# Patient Record
Sex: MALE | Race: WHITE | NOT HISPANIC OR LATINO | Employment: FULL TIME | URBAN - METROPOLITAN AREA
[De-identification: names, ages, dates, MRNs, and addresses within clinical notes are randomized per-mention and may not be internally consistent; named-entity substitution may affect disease eponyms.]

---

## 2017-01-10 ENCOUNTER — GENERIC CONVERSION - ENCOUNTER (OUTPATIENT)
Dept: OTHER | Facility: OTHER | Age: 72
End: 2017-01-10

## 2017-01-13 ENCOUNTER — ALLSCRIPTS OFFICE VISIT (OUTPATIENT)
Dept: OTHER | Facility: OTHER | Age: 72
End: 2017-01-13

## 2017-01-13 DIAGNOSIS — G62.9 POLYNEUROPATHY: ICD-10-CM

## 2017-01-13 DIAGNOSIS — G89.4 CHRONIC PAIN SYNDROME: ICD-10-CM

## 2017-01-13 DIAGNOSIS — M79.10 MYALGIA: ICD-10-CM

## 2017-01-13 DIAGNOSIS — M47.816 SPONDYLOSIS OF LUMBAR REGION WITHOUT MYELOPATHY OR RADICULOPATHY: ICD-10-CM

## 2017-01-20 ENCOUNTER — GENERIC CONVERSION - ENCOUNTER (OUTPATIENT)
Dept: OTHER | Facility: OTHER | Age: 72
End: 2017-01-20

## 2017-03-01 ENCOUNTER — GENERIC CONVERSION - ENCOUNTER (OUTPATIENT)
Dept: OTHER | Facility: OTHER | Age: 72
End: 2017-03-01

## 2017-03-07 ENCOUNTER — ALLSCRIPTS OFFICE VISIT (OUTPATIENT)
Dept: OTHER | Facility: OTHER | Age: 72
End: 2017-03-07

## 2017-05-01 ENCOUNTER — ALLSCRIPTS OFFICE VISIT (OUTPATIENT)
Dept: OTHER | Facility: OTHER | Age: 72
End: 2017-05-01

## 2017-05-08 ENCOUNTER — GENERIC CONVERSION - ENCOUNTER (OUTPATIENT)
Dept: OTHER | Facility: OTHER | Age: 72
End: 2017-05-08

## 2017-07-10 ENCOUNTER — ALLSCRIPTS OFFICE VISIT (OUTPATIENT)
Dept: OTHER | Facility: OTHER | Age: 72
End: 2017-07-10

## 2017-09-07 ENCOUNTER — GENERIC CONVERSION - ENCOUNTER (OUTPATIENT)
Dept: OTHER | Facility: OTHER | Age: 72
End: 2017-09-07

## 2017-10-02 ENCOUNTER — ALLSCRIPTS OFFICE VISIT (OUTPATIENT)
Dept: OTHER | Facility: OTHER | Age: 72
End: 2017-10-02

## 2017-10-16 ENCOUNTER — GENERIC CONVERSION - ENCOUNTER (OUTPATIENT)
Dept: OTHER | Facility: OTHER | Age: 72
End: 2017-10-16

## 2018-01-10 NOTE — MISCELLANEOUS
Message  Return to work or school:   Garret Mead is under my professional care  He was seen in my office on 1/13/17   He is able to return to work on  if light duty available    He is not able to participate in sports or gym class  Weight Bearing Status: Full Weight-Bearing  No lifting, pulling, pushing, carrying over 10 #, no frequent bending or any climbing or balancing          Signatures   Electronically signed by : Mendel Rao, DO; Jan 13 2017  3:34PM EST                       (Author)

## 2018-01-11 NOTE — RESULT NOTES
Message   Recorded as Task   Date: 01/19/2017 10:21 AM, Created By: Darian Watts   Task Name: Follow Up   Assigned To: [facility] [place] [facility],Team   Regarding Patient: Kirsten Greenfield, Status: In Progress   Comment:    Sean Medrano - 19 Jan 2017 10:21 AM     TASK CREATED  Caller: demetrius briceño, Spouse; General Medical Question; [phone]  m 1/19/2017 @ [phone]  [facility] calling for   Getting low on duloxetine 30 mg  [facility] [place]   Zeynep Loza - 19 Jan 2017 12:03 PM     TASK EDITED  Script last filled on 12/8  No sovs scheduled  Last seen on 12/8  DG to advise  thanks   Sukhjinder Hill - 19 Jan 2017 12:12 PM     TASK REPLIED TO: Previously Assigned To Sukhjinder Hill  How is he doing on the 30 mg? Any relief? Any side effects? Does he want to try to titrate up to 40 mg for now? Sean Medrano - 19 Jan 2017 2:43 PM     TASK EDITED  Attempted to reach hope at the cb number provided per release of inf on file  "The person you are trying to reach is not accepting calls at this time  "    Left a detailed message on machine as per release of info on file  Advised pt to cb to discuss duloxedtine  provided cb number  Zeynep Loza - 20 Jan 2017 11:54 AM     TASK EDITED  S/w th ept  and he states he doesn't feel like it is really working at all  His pain is still the same  He just notices that he has a little hot flashes in the am and then they go away  DG to advise  Sukhjinder Griffin - 20 Jan 2017 12:35 PM     TASK REPLIED TO: Previously Assigned To Sukhjnider Hill  Please advise the patient that he is on a low and sub therapeutic dose  Since he is not noting any sig side effects I am going to increase it to 40 mg at bedtime, but it doesn't come in 40 mg, but I did send a script for 20 mg 2 PO HS and he should take that until his next OV  He should call if he experiences any side effects or issues with increase  Thank you  Zeynep Loza - 20 Jan 2017 1:11 PM     TASK EDITED  S/w th ept  and he is aware  Signatures   Electronically signed by : La Ibarra, ; Jan 20 2017  1:11PM EST                       (Author)

## 2018-01-11 NOTE — CONSULTS
Assessment   1  Peripheral neuropathy (356 9) (G62 9)  2  Chronic myofascial pain (729 1,338 29) (M79 1,G89 29)    Plan  Chronic myofascial pain    · Follow-up Visit in 4 Weeks Evaluation and Treatment  Follow-up  Status: Hold For -  Scheduling  Requested for: 26JND5096   · (1) ALDOLASE; Status:Active; Requested UQF:[id number]; 1   Chronic myofascial pain, Lumbar spondylosis, Pain syndrome, chronic, Peripheral  neuropathy    · (1) CK (CPK); Status:Active; Requested VFY:[id number]; 1    · (1) SED RATE; Status:Active; Requested QQR:[id number]; 1   Lumbar spondylosis, Pain syndrome, chronic, Peripheral neuropathy    · (1) C-REACTIVE PROTEIN; Status:Active; Requested PTH:[id number]; 1      1 Amended By: Jesusita Monterroso; Jan 13 2017 3:35 PM EST    Discussion/Summary    Continue your care with Pain Medicine and Dr Diogo Peterson office  Salvador Roberson Botox injections may be beneficial    Any disability forms can be sent to this office  Chief Complaint  Return to work issues  History of Present Illness  74 yo male referred by Pain Medicine for disability issues  Last worked January 25th 2016    / heavy duty  lifting 30 to 50 # 25 times an hour  Currently under care of Pain Medicine, current pregabalin trial  followed by Dr Ledy Castillo of Orthopedics, [place] [facility]ESIs and SNRBs, had facet joint injection w/o benefit  Has been referred to  [facility] for Tx of myofascial yocasta   consult pending  Pattern is diffuse lumbodorsal region   most recent onset with arising from couch  Reports onset of LE Sx of numbness and tingling about October shortly noticed after LESI  Had an episode of shocklike sensations from chest distally last year that eventually resolved  No BBI  Had EDX study c/w chronic Left L-5 radiculopathy 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  Salvador Roberson Neurology evaluation for this is pending   told maybe "borderline DM  Active Problems   1  Arthritis (716 90) (M19 90)  2   Chronic low back pain (724 2,338 29) (M54 5,G89 29)  3  Chronic myofascial pain (729 1,338 29) (M79 1,G89 29)  4  Disability examination (V68 01) (Z02 71)  5  GERD (gastroesophageal reflux disease) (530 81) (K21 9)  6  Lumbar spondylosis (721 3) (M47 816)  7  Pain syndrome, chronic (338 4) (G89 4)  8  Peripheral neuropathy (356 9) (G62 9)  9  Work related injury (959 9) (Y99 0)    Surgical History   1  History of Appendectomy    Family History  Father   1  Family history of cardiac disorder (V17 49) (Z82 49)    Social History    · Denied: History of Drug use   · Never a smoker   · Social alcohol use (Z78 9)    Current Meds  1  CeleBREX 200 MG Oral Capsule (Celecoxib); TAKE 1 CAPSULE DAILY WITH A MEAL; Therapy: (Recorded:22Nov2016) to Recorded  2  DULoxetine HCl - 30 MG Oral Capsule Delayed Release Particles; Once off of   Gabapentin  Take 1 pill every other night x 10 days, then 1 pill every night; Therapy: 15IQB1607 to (Evaluate:07Jan2017)  Requested for: 12Jan2017; Last   Rx:86Ylf0906; Status: ACTIVE - Renewal Denied Ordered  3  Gabapentin 300 MG Oral Capsule; Take 1 pill 3 x daily x 3 days, then 1 pill 2 x radha   x 3 days, then 1 pill daily x 4 days, then STOP  Then start Duloxetine; Therapy: 27UDM7034 to (Evaluate:80Vjd0132); Last Rx:20Mmi3345 Ordered  4  Omega-3 Fish Oil CAPS; take 1 capsule daily; Therapy: (Recorded:22Nov2016) to Recorded  5  Osteo-Bi-Flex TABS; Take 1 tablet twice daily; Therapy: (Recorded:22Nov2016) to Recorded  6  Tylenol Extra Strength 500 MG TABS; TAKE 1 TABLET EVERY 4 TO 6 HOURS AS   NEEDED; Therapy: (Recorded:22Nov2016) to Recorded  7  Vitamin D 2000 UNIT Oral Capsule; take 1 capsule daily; Therapy: (Recorded:22Nov2016) to Recorded    Allergies   1  Morphine Derivatives  2  OxyCODONE HCl CAPS  3  Codeine  4   Gabapentin CAPS    Vitals  Signs   Recorded: 12RSR0861 02:46PM   Heart Rate: 073  Systolic: 126  Diastolic: 80  Height: 6 ft 2 in  Weight: 172 lb 8 0 oz  BMI Calculated: 22 15  BSA Calculated: 2 04    Physical Exam  Mild fine tremor in RUE  Lumbosacral Spine: Special Tests: negative Straight Leg Raise  Constitutional - General appearance: Normal    Musculoskeletal - Gait and station: Normal  Digits and nails: Normal  Muscle strength/tone: Normal    Neurologic - Cranial nerves: Normal  Reflexes: Abnormal  Deep tendon reflexes: 1+ right patella, 1+ left patella, 1+ right ankle jerk, 1+ left ankle jerk and Sharif's negative,2+ right biceps, 2+ left biceps, 2+ right triceps, 2+ left triceps, 2+ right brachioradialis, 2+ left brachioradialis, no ankle clonus on the right and no ankle clonus on the left  Upper extremity peripheral neuro exam: Normal  Distal pulse examination findings: MMT grades all muscles 5/5  Lower extremity peripheral neuro exam: Normal  Distal pulse examination findings: able to walk on toes and heels and squat  Results/Data  I personally reviewed the films/images/results in the office today  My interpretation follows  MRI Review ? 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

## 2018-01-11 NOTE — PROGRESS NOTES
Assessment    1  Chronic low back pain (724 2,338 29) (M54 5,G89 29)   2  Chronic myofascial pain (729 1,338 29) (M79 1,G89 29)   3  Lumbar spondylosis (721 3) (M47 816)   4  Peripheral neuropathy (356 9) (G62 9)   5  Pain syndrome, chronic (338 4) (G89 4)    Plan   Chronic low back pain, Chronic myofascial pain, Lumbar spondylosis, Pain syndrome,  chronic, Peripheral neuropathy    · Nortriptyline HCl - 10 MG Oral Capsule; Once off of Cymbalta  Take 1 pill every other  night x 10 days, then 1 PO HS x 2 weeks, then 2 PO HS   Rx By: Grupo Harrell; Dispense: 30 Days ; #:60 Capsule; Refill: 1; For: Chronic low back pain, Chronic myofascial pain, Lumbar spondylosis, Pain syndrome, chronic, Peripheral neuropathy; JANEL = N; Verified Transmission to [id number]; Last Updated By: System, [employer]; 3/7/2017 10:03:13 AM   · From  DULoxetine HCl - 20 MG Oral Capsule Delayed Release Particles Take  2 PO hs To DULoxetine HCl - 20 MG Oral Capsule Delayed Release Particles Take 1  pill at bed time x 7 days, then 1 pill every other night x 6 days, then  STOP   Rx By: Grupo Harrell; Dispense: 13 Days ; #:10 Capsule Delayed Release Particles; Refill: 0; For: Chronic low back pain, Chronic myofascial pain, Lumbar spondylosis, Pain syndrome, chronic, Peripheral neuropathy; JANEL = N; Record    Follow-up visit in 2 months Evaluation and Treatment  Follow-up  Status: Hold For - Scheduling  Requested for: 67DAH6683  Ordered; For: Pain syndrome, chronic;  Ordered By: Grupo Harrell  Performed:   Due: 08RZA8807     Discussion/Summary    While the patient and his wife are in the office today, I did have a thorough conversation with the patient regarding his medication regimen and treatment plan options  At this point I still feel there is a significant myofascial component and I would like him to follow-up with Dr Carlos Enrique Marinelli for the consultation as scheduled later on this month  The patient was agreeable and verbalized an understanding  With regards to his medication regimen as at this point that seems to be one of his biggest options, since he is on a subtherapeutic dose of Cymbalta, but is noting side effects, I feel would be in his best interest to titrate off of the Cymbalta as discussed and try different neuropathic medication such as nortriptyline  I advised the patient that once she is off the nortriptyline he is to take the nortriptyline one pill every other night for 10 days, then 1 pill every night for 2 weeks, then 2 pills at bedtime  I advised the patient that since she seems to be sensitive to medications, we are taking a very slow and steady titration approach with the nortriptyline as this is a subtherapeutic dose, but we are trying to see if he can tolerate the medication before we get more into the therapeutic dosing  I advised the patient that if they experience any side effects or issues with the changes in their medication regiment, they should give our office a call to discuss  I also advised the patient not to drive or operate machinery until they see how the changes in the medication regimen affects them  The patient was agreeable and verbalized an understanding  The patient is to schedule a follow-up office visit in 2 months at that point time we will regroup with regards to the medication regimen and treatment plan  The patient was agreeable and verbalized an understanding  The patient has the current Goals: To find a medication regimen and treatment plan the provided at least moderate in stable relief, without side effects  The patent has the current Barriers: None  Patient is able to Self-Care  Educational resources provided: While the patient was in the office today, I discussed with the patient that with medication management our overall goal is to reduce the pain symptoms by 50%, 50% of the time, on the least amount medications, with the least amount side effects  The patient was agreeable and verbalized an understanding  Possible side effects of new medications were reviewed with the patient/guardian today  The treatment plan was reviewed with the patient/guardian  The patient/guardian understands and agrees with the treatment plan   The patient and patient's family was counseled regarding instructions for management, prognosis, patient and family education, impressions, risks and benefits of treatment options and importance of compliance with treatment  total time of encounter was 25 minutes  Chief Complaint    1  Pain  Chronic low back and leg pain  History of Present Illness  The patient and his wife present today for a follow-up office visit  The patient is currently being treated for his chronic low back pain and lower extremity radicular symptoms with EMG evidence of a left L5 radiculopathy  Since his last office visit his pain symptoms have not improved or changed, despite the fact that we have titrate him off of the gabapentin and he is now on Cymbalta at 40 mg a day  Patient reports that he does not feel he is noting any relief from the Cymbalta and since he is on the Cymbalta he is having intermittent headaches and hot flashes  The patient reports that he did schedule a consultation with Dr Ayden Webber and is going to see him on March 20, 2017 for the evaluation of the chronic myofascial pain  The patient and his wife present today to discuss his medication regimen and treatment plan  Paris Lui presents with complaints of constant episodes of moderate bilateral neck, bilateral upper back, bilateral lower back, bilateral buttock, bilateral lower leg, bilateral ankle and bilateral foot pain, described as sharp, burning, throbbing and cramping, radiating to the neck, upper back, lower back, bilateral buttock and bilateral lower extremity  On a scale of 1 to 10, the patient rates the pain as 7  Symptoms are unchanged        Review of Systems    Constitutional: no fever, no recent weight gain and no recent weight loss  Eyes: no double vision and no blurry vision  Cardiovascular: no chest pain, no palpitations and no lower extremity edema  Respiratory: no complaints of shortness of breath and no wheezing  Musculoskeletal: difficulty walking, muscle weakness, joint stiffness, limb swelling legs, pain in extremity legs, feet and decreased range of motion, but no joint swelling  Neurological: no dizziness, no difficulty swallowing, no memory loss, no loss of consciousness and no seizures  Gastrointestinal: no nausea, no vomiting, no constipation and no diarrhea  Genitourinary: no difficulty initiating urine stream, no genital pain and no frequent urination  Integumentary: no complaints of skin rash  Psychiatric: no depression  Endocrine: no excessive thirst, no adrenal disease, no hypothyroidism and no hyperthyroidism  Hematologic/Lymphatic: no tendency for easy bruising and no tendency for easy bleeding  Active Problems    1  Arthritis (716 90) (M19 90)   2  Chronic low back pain (724 2,338 29) (M54 5,G89 29)   3  Chronic myofascial pain (729 1,338 29) (M79 1,G89 29)   4  Disability examination (V68 01) (Z02 71)   5  GERD (gastroesophageal reflux disease) (530 81) (K21 9)   6  Lumbar spondylosis (721 3) (M47 816)   7  Pain syndrome, chronic (338 4) (G89 4)   8  Peripheral neuropathy (356 9) (G62 9)   9  Work related injury (959 9) (Y99 0)    Past Medical History    The active problems and past medical history were reviewed and updated today  Surgical History    1  History of Appendectomy    The surgical history was reviewed and updated today  Family History  Father    1  Family history of cardiac disorder (V17 49) (Z82 49)    The family history was reviewed and updated today  Social History    · Denied: History of Drug use   · Never a smoker   · Social alcohol use (Z78 9)  The social history was reviewed and updated today   The social history was reviewed and is unchanged  Current Meds   1  CeleBREX 200 MG Oral Capsule; TAKE 1 CAPSULE DAILY WITH A MEAL; Therapy: (Recorded:22Nov2016) to Recorded   2  DULoxetine HCl - 20 MG Oral Capsule Delayed Release Particles; Take 2 PO hs;   Therapy: 58SXM4767 to (Evaluate:26Mar2017)  Requested for: 61Itt0672; Last   Rx:07Vhb0622 Ordered   3  Omega-3 Fish Oil CAPS; take 1 capsule daily; Therapy: (Recorded:22Nov2016) to Recorded   4  Osteo-Bi-Flex TABS; Take 1 tablet twice daily; Therapy: (Recorded:22Nov2016) to Recorded   5  Tylenol Extra Strength 500 MG TABS; TAKE 1 TABLET EVERY 4 TO 6 HOURS AS   NEEDED; Therapy: (Recorded:22Nov2016) to Recorded   6  Vitamin D 2000 UNIT Oral Capsule; take 1 capsule daily; Therapy: (Recorded:22Nov2016) to Recorded    The medication list was reviewed and updated today  Allergies    1  Morphine Derivatives   2  OxyCODONE HCl CAPS   3  Codeine   4  Gabapentin CAPS    Vitals  Vital Signs    Recorded: 03EUW2700 09:38AM   Temperature 98 F   Heart Rate 64   Systolic 758   Diastolic 82   Height 6 ft 2 in   Weight 177 lb 8 0 oz   BMI Calculated 22 79   BSA Calculated 2 07   Pain Scale 7     Physical Exam    Constitutional   General appearance: Well developed, well nourished, alert, in no distress, non-toxic and no overt pain behavior  Eyes   Sclera: anicteric   HEENT   Hearing grossly intact  Pulmonary   Respiratory effort: Even and unlabored  Cardiovascular   Examination of extremities: No edema or pitting edema present  Abdomen   Abdomen: Soft, non-tender, non-distended  Skin   Skin and subcutaneous tissue: Normal without rashes or lesions, well hydrated  Psychiatric   Mood and affect: Mood and affect appropriate  Neurologic Motor Tone:    Cranial nerves: Cranial nerves II-XII grossly intact  Slightly antalgic, but steady gait without the use of any assistive devices     Musculoskeletal       Future Appointments    Date/Time Provider Specialty Site 05/01/2017 09:30 AM KRISH [facility]     Signatures   Electronically signed by : KRISH Harrison; Mar  8 2017  6:50AM EST                       (Author)    Electronically signed by : Keitha Bosworth, DO; Mar  8 2017  1:52PM EST

## 2018-01-11 NOTE — MISCELLANEOUS
Message   Recorded as Task   Date: 10/16/2017 11:48 AM, Created By: Liliam Palomino   Task Name: Miscellaneous   Assigned To: [facility],Team   Regarding Patient: Sharan Dunne, Status: Active   Comment:    Stephanie Campos - 16 Oct 2017 11:48 AM     TASK CREATED  Irena Montana from [facility] (pt's PCP) called inquiring if pt had blood work done  Irena Montana can be reached at [phone]  She also gave fax # [phone]  Sean Medrano - 16 Oct 2017 11:57 AM     TASK EDITED  s/w desiree  Advised that the only bw available per allscripts is from January 2017 and october 2016  Per Desiree's request, results were faxed to Dr Elaina Hendrix office at [phone]        Active Problems    1  Arthritis (716 90) (M19 90)   2  Chronic low back pain (724 2,338 29) (M54 5,G89 29)   3  Chronic myofascial pain (729 1,338 29) (M79 1,G89 29)   4  Disability examination (V68 01) (Z02 71)   5  GERD (gastroesophageal reflux disease) (530 81) (K21 9)   6  Lumbar spondylosis (721 3) (M47 816)   7  Pain syndrome, chronic (338 4) (G89 4)   8  Peripheral neuropathy (356 9) (G62 9)   9  Shortness of breath (786 05) (R06 02)   10  Work related injury (959 9) (Y99 0)    Current Meds   1  B-12 3000 MCG Oral Capsule Recorded   2  Nortriptyline HCl - 25 MG Oral Capsule; Take 1 pill every other night until gone and then   STOP; Therapy: 84EGW0666 to (Jalen Lopez)  Requested for: 08QMG4070; Last   Rx:02Oct2017 Ordered   3  Omega-3 Fish Oil CAPS; take 1 capsule daily; Therapy: (Recorded:22Nov2016) to Recorded   4  Osteo-Bi-Flex TABS; Take 1 tablet twice daily; Therapy: (Recorded:22Nov2016) to Recorded   5  Tylenol Extra Strength 500 MG TABS; TAKE 1 TABLET EVERY 4 TO 6 HOURS AS   NEEDED; Therapy: (Recorded:22Nov2016) to Recorded   6  Vitamin D 2000 UNIT Oral Capsule; take 1 capsule daily; Therapy: (Recorded:22Nov2016) to Recorded    Allergies    1  Morphine Derivatives   2  OxyCODONE HCl CAPS   3  Codeine   4  Gabapentin CAPS    Signatures   Electronically signed by : Carl Goyal, ; Oct 16 2017 11:58AM EST                       (Author)

## 2018-01-12 VITALS
HEART RATE: 100 BPM | WEIGHT: 172.5 LBS | SYSTOLIC BLOOD PRESSURE: 128 MMHG | HEIGHT: 74 IN | BODY MASS INDEX: 22.14 KG/M2 | DIASTOLIC BLOOD PRESSURE: 80 MMHG

## 2018-01-12 VITALS
DIASTOLIC BLOOD PRESSURE: 80 MMHG | SYSTOLIC BLOOD PRESSURE: 132 MMHG | HEIGHT: 74 IN | BODY MASS INDEX: 22.65 KG/M2 | TEMPERATURE: 98 F | HEART RATE: 72 BPM | WEIGHT: 176.5 LBS

## 2018-01-12 NOTE — MISCELLANEOUS
Message   Recorded as Task   Date: 09/06/2017 03:35 PM, Created By: Juanis Bowers   Task Name: Call Back   Assigned To: [facility] [place] [facility],Team   Regarding Patient: Markell Monreal, Status: Active   CommentNima Rodriguez - 06 Sep 2017 3:35 PM     [id number] [address]- Patients wife Farhad Oakley called stating patient needs a refill Nortriptyline 25mg capsules   stated patient has 6 pills left   stating patient is being weaned off and have some questions about the directions   patient is not scheduled until 10/02/17 w/ DG   would like refill sent to [phone]   any questions c/b [phone] (home)   [facility] - 07 Sep 2017 9:40 AM     TASK EDITED  S/W Hope-pt's wife as per release of health information  Cheryl requesting refill of Nortriptyline 25mg, pt takes 1 capsule at hs and pt is wondering if he should continue taking it that way to he sees DG in October? Pharmacy on file  Please advise  Sukhjinder Hill - 07 Sep 2017 9:47 AM     TASK REPLIED TO: Previously Assigned To Sukhjinder Hill  I did escribe a 30 day supply so he can continue 1 PO HS to get him to his OV in Oct and we will discuss further then  Thank you  [facility] - 07 Sep 2017 10:05 AM     TASK EDITED  S/W Cheryl  Advised [facility] of the same and [facility] was [facility]   She verbalized understanding  Active Problems    1  Arthritis (716 90) (M19 90)   2  Chronic low back pain (724 2,338 29) (M54 5,G89 29)   3  Chronic myofascial pain (729 1,338 29) (M79 1,G89 29)   4  Disability examination (V68 01) (Z02 71)   5  GERD (gastroesophageal reflux disease) (530 81) (K21 9)   6  Lumbar spondylosis (721 3) (M47 816)   7  Pain syndrome, chronic (338 4) (G89 4)   8  Peripheral neuropathy (356 9) (G62 9)   9  Work related injury (959 9) (Y99 0)    Current Meds   1  B-12 3000 MCG Oral Capsule Recorded   2  Nortriptyline HCl - 25 MG Oral Capsule; Take 1 po hs;    Therapy: 74GUZ0782 to ([phone])  Requested for: 58Yly7138; Last   Rx:77Hmi2123 Ordered   3  Omega-3 Fish Oil CAPS; take 1 capsule daily; Therapy: (Recorded:22Nov2016) to Recorded   4  Osteo-Bi-Flex TABS; Take 1 tablet twice daily; Therapy: (Recorded:22Nov2016) to Recorded   5  Tylenol Extra Strength 500 MG TABS; TAKE 1 TABLET EVERY 4 TO 6 HOURS AS   NEEDED; Therapy: (Recorded:22Nov2016) to Recorded   6  Vitamin D 2000 UNIT Oral Capsule; take 1 capsule daily; Therapy: (Recorded:22Nov2016) to Recorded    Allergies    1  Morphine Derivatives   2  OxyCODONE HCl CAPS   3  Codeine   4   Gabapentin CAPS    Signatures   Electronically signed by : Enrique Tyler, ; Sep  7 2017 10:05AM EST                       (Author)

## 2018-01-13 VITALS
HEIGHT: 74 IN | BODY MASS INDEX: 22.39 KG/M2 | TEMPERATURE: 98.1 F | DIASTOLIC BLOOD PRESSURE: 82 MMHG | SYSTOLIC BLOOD PRESSURE: 122 MMHG | WEIGHT: 174.5 LBS | HEART RATE: 72 BPM

## 2018-01-13 VITALS
HEIGHT: 74 IN | HEART RATE: 80 BPM | DIASTOLIC BLOOD PRESSURE: 80 MMHG | TEMPERATURE: 97.8 F | SYSTOLIC BLOOD PRESSURE: 134 MMHG | BODY MASS INDEX: 22.65 KG/M2 | WEIGHT: 176.5 LBS

## 2018-01-13 NOTE — MISCELLANEOUS
Message   Recorded as Task   Date: 05/08/2017 01:25 PM, Created By: Marilee Sher   Task Name: Miscellaneous   Assigned To: [facility] [place] [facility]   Regarding Patient: Mainor Cartwright, Status: Active   Comment:    JennaTasha - 08 May 2017 1:25 PM     TASK CREATED  pt sees Sukhjinder and he was told to let him know how he was doing on his nortriptyline that he currently takes 10 mg  2 at bedtime and that if he was good he was gonna up the dose  please call back at [phone] available all day   Sean Medrano - 08 May 2017 1:50 PM     TASK EDITED  S/w pt, confirmed nortriptylline 10 mg, 2 tabs po qhs  Pt states he can sit longer, walk longer, rates improvement ~10%  No se's w/ nortriptylline  Pt questioned an increase in nortriptylline as discussed at ov  Pt confirmed, duloxetine has been d/c'd  Pt states celebrex is not covered by ins per pharmacy  Recommended meloxicam or naproxen  Confirmed above w/ pts wife, Cheryl, per release of info on file  Advised Hope, will d/w DG and cb to advise  Sukhjinder Hill - 08 May 2017 2:59 PM     TASK REPLIED TO: Previously Assigned To Chantell Sprague - 08 May 2017 3:01 PM     TASK REASSIGNED: Previously Assigned To [facility] [facility]  He is to finish out the 10 mg, 2 PO HS until they are gone, then the new script is for 25 mg, 1 PO HS x 3 weeks, then 2 PO HS until his next OV  I did escribe a script for that to the pharmacy with a refill  He should call us if there is any side effects and he should not drive or operate machinery until he sees how the changes in the medication regimen affects him  With regards to the Celebrex, we can try meloxicam 7 5 mg daily  I e-sriebed a script with a refill  Thank you  Sean Medrano - 08 May 2017 3:10 PM     TASK EDITED  s/w pt, advised of above  pt verbalized understanding and appreciation  Confirmed ov w/ dg 7/10  Will c/b w/ se's or issues  Active Problems    1  Arthritis (037 90) (M19 90)   2  Chronic low back pain (724 2,338 29) (M54 5,G89 29)   3  Chronic myofascial pain (729 1,338 29) (M79 1,G89 29)   4  Disability examination (V68 01) (Z02 71)   5  GERD (gastroesophageal reflux disease) (530 81) (K21 9)   6  Lumbar spondylosis (721 3) (M47 816)   7  Pain syndrome, chronic (338 4) (G89 4)   8  Peripheral neuropathy (356 9) (G62 9)   9  Work related injury (959 9) (Y99 0)    Current Meds   1  B-12 3000 MCG Oral Capsule Recorded   2  Meloxicam 7 5 MG Oral Tablet; TAKE 1 TABLET DAILY WITH FOOD  NO OTHER NSAIDS   while on this medication; Therapy: 96DXH3801 to (Evaluate:40Ldg7292)  Requested for: 62TEU7031; Last   NZ:[id number] Ordered   3  Nortriptyline HCl - 25 MG Oral Capsule; Take 1 PO HS x 3 weeks, then 2 PO HS; Therapy: 77SMQ7380 to (Evaluate:39Axq8227)  Requested for: 47LXV3236; Last   LB:[id number] Ordered   4  Omega-3 Fish Oil CAPS; take 1 capsule daily; Therapy: (Recorded:22Nov2016) to Recorded   5  Osteo-Bi-Flex TABS; Take 1 tablet twice daily; Therapy: (Recorded:22Nov2016) to Recorded   6  Tylenol Extra Strength 500 MG TABS; TAKE 1 TABLET EVERY 4 TO 6 HOURS AS   NEEDED; Therapy: (Recorded:22Nov2016) to Recorded   7  Vitamin D 2000 UNIT Oral Capsule; take 1 capsule daily; Therapy: (Recorded:22Nov2016) to Recorded    Allergies    1  Morphine Derivatives   2  OxyCODONE HCl CAPS   3  Codeine   4   Gabapentin CAPS    Signatures   Electronically signed by : Lyn Garza, ; May  8 2017  3:11PM EST                       (Author)

## 2018-01-14 VITALS
SYSTOLIC BLOOD PRESSURE: 124 MMHG | HEIGHT: 74 IN | TEMPERATURE: 98 F | HEART RATE: 64 BPM | WEIGHT: 177.5 LBS | DIASTOLIC BLOOD PRESSURE: 82 MMHG | BODY MASS INDEX: 22.78 KG/M2

## 2018-01-15 NOTE — MISCELLANEOUS
Message   Recorded as Task   Date: 12/05/2016 02:35 PM, Created By: Catie Arredondo   Task Name: Med Renewal Request   Assigned To: [facility] [place] [facility]   Regarding Patient: Lisa Burnham, Status: In Progress   Comment:    Sean Medrano - 05 Dec 2016 2:35 PM     TASK CREATED  Caller: Hope, Spouse; Renew Medication; [phone]  [facility] 12/5/2016 @ 1217  Pt's wife calling  Requesting Gabapentin 300 mg refill  [facility] to [facility] [place]  Zeynep Loza - 05 Dec 2016 3:30 PM     TASK EDITED  Pt's last sovs was on 11/22 with you  SL to advise  Thanks   Tim Awad - 05 Dec 2016 3:44 PM     TASK REPLIED TO: Previously Assigned To Tim Awad  who prescribed? Sean Medrano - 06 Dec 2016 1:39 PM     TASK EDITED  [facility] 12/06/2016 @ 1215  Hope calling for pt  States he will run out of gabapentin today or tomorrow  Please call rx into [facility]  Pls cb at [phone]    [facility] to cb on cb number provided    Left a detailed message on machine as per release of info on file advising of vm's - please cb w/ additional info r/t gabapentin  Who is prescribing this medication? Provided cb number  Sean Medrano - 06 Dec 2016 4:37 PM     TASK EDITED  [facility] 12/6/2016 @ 1515  Cheryl navarro calling re: Kayla Plunkett prescribed gabapentin  Per Hope, Dr Drew Bloom stated that he would take over the gabapentin  Discussed a larger dose, 300 mg  Please cb      12/6/2016 @ 1610 Pt and wife at the office window re: gabapentin  Advised  staff, ok to tell pt, will d/w Dr Drew Bloom and c/b with an update asap  ***Per allscripts, Gabapentin 100 mg, 3 tabs at bedtime  Tim Awad - 07 Dec 2016 10:57 AM     TASK REPLIED TO: Previously Assigned To Tim Awad  can increase to 300  mg bid-ok to call in   Sean Medrano - 07 Dec 2016 11:13 AM     TASK EDITED  S/w pt's wife, cheryl, as per release of info on file  [facility] of [facility] [place]  Pt should c/b if any se's or questions arise       As per Dr Linda Aguirre, advised Hope, pt is to schedule an ov w/ DG to review pain diary - negative  Offered ov w/ DG on 12/8/2016  Hope verbalized agreement and understanding  Rx called in; gabapentin 300 mg, 1 tab po bid #60/0  Noted in Allscripts  Active Problems    1  Arthritis (716 90) (M19 90)   2  Chronic low back pain (724 2,338 29) (M54 5,G89 29)   3  GERD (gastroesophageal reflux disease) (530 81) (K21 9)   4  Lumbar spondylosis (721 3) (M47 816)   5  Peripheral neuropathy (356 9) (G62 9)    Current Meds   1  CeleBREX 200 MG Oral Capsule (Celecoxib); TAKE 1 CAPSULE DAILY WITH A MEAL; Therapy: (Recorded:22Nov2016) to Recorded   2  Gabapentin 100 MG Oral Capsule; TAKE 3 CAPSULE Bedtime; Therapy: (Recorded:22Nov2016) to Recorded   3  Omega-3 Fish Oil CAPS; take 1 capsule daily; Therapy: (Recorded:22Nov2016) to Recorded   4  Osteo-Bi-Flex TABS; Take 1 tablet twice daily; Therapy: (Recorded:22Nov2016) to Recorded   5  Tylenol Extra Strength 500 MG TABS; TAKE 1 TABLET EVERY 4 TO 6 HOURS AS   NEEDED; Therapy: (Recorded:22Nov2016) to Recorded   6  Vitamin D 2000 UNIT Oral Capsule; take 1 capsule daily; Therapy: (Recorded:22Nov2016) to Recorded    Allergies    1  Morphine Derivatives   2   OxyCODONE HCl CAPS    Signatures   Electronically signed by : Lyn Garza, ; Dec  7 2016 11:14AM EST                       (Author)

## 2018-01-16 NOTE — MISCELLANEOUS
Message   Recorded as Task   Date: 02/24/2017 01:50 PM, Created By: Santosh Whiteside   Task Name: Med Renewal Request   Assigned To: [facility] [place] [facility]   Regarding Patient: Abdullahi Bartlett, Status: In Progress   Comment:    Sean Medrano - 24 Feb 2017 1:50 PM     TASK CREATED  Caller: demetrius briceño, Spouse; Renew Medication; [phone]  s/w pt's wife, hope, per release of info on file  States pt has 3 days of duloxetine 20 mg, 2 tabs po qhs on hand  Last rx 1/20 - increased from 30 mg po qhs  Per pt, no relief w/ medication  C/o "slight dizziness"  [facility]  Will d/w dr on call and cb to advise  **Refill w/ ov in 4 weeks or d/w DG on Monday? Will take his last meds on Sunday  Hailejeanie Rivera - 24 Feb 2017 3:34 PM     TASK REPLIED TO: Previously Assigned To Haile Miguel                      As long as the dizziness is not too severe and he can tolerate it please have him continue with duloxetine 20 mg 2 tabs by mouth daily at bedtime  I have sent a refill for the prescription to the pharmacy so that he will not run out of the medication  The patient should call back on Monday to discuss with DG regarding further treatment strategy and whether to continue, increase, or wean off of the medication   Sean Medrano - 24 Feb 2017 4:30 PM     TASK EDITED  Left a detailed message on machine advising of above as per release of info on file  Provided cb number for questions or concerns  Will fu on Monday w/ pt and DG  Sean Medrano - 28 Feb 2017 9:11 AM     TASK EDITED  LMOM to cb on home #  Sean Medrano - 01 Mar 2017 9:22 AM     TASK EDITED  *** FYI ***  s/w pt's wife hope, per release of info on file  Per hope, pt is ok to continue w/ duloxetine as prescribed  Dizziness is not interfering w/ activities  Confirmed 3/7 ov w/ DG  Advised hope, will make DG aware  cb w/ any changes  Hope verbalized understanding and appreciation     Sukhjinder Hill - 01 Mar 2017 10:19 AM     TASK REPLIED TO: Previously Assigned To Leslee Ayala  Provider aware  We will re-group at his OV next week  Thank you  Active Problems    1  Arthritis (716 90) (M19 90)   2  Chronic low back pain (724 2,338 29) (M54 5,G89 29)   3  Chronic myofascial pain (729 1,338 29) (M79 1,G89 29)   4  Disability examination (V68 01) (Z02 71)   5  GERD (gastroesophageal reflux disease) (530 81) (K21 9)   6  Lumbar spondylosis (721 3) (M47 816)   7  Pain syndrome, chronic (338 4) (G89 4)   8  Peripheral neuropathy (356 9) (G62 9)   9  Work related injury (959 9) (Y99 0)    Current Meds   1  CeleBREX 200 MG Oral Capsule (Celecoxib); TAKE 1 CAPSULE DAILY WITH A MEAL; Therapy: (Recorded:22Nov2016) to Recorded   2  DULoxetine HCl - 20 MG Oral Capsule Delayed Release Particles; Take 2 PO hs;   Therapy: 26PJM4107 to (Evaluate:26Mar2017)  Requested for: 16Fir7399; Last   Rx:[id number] Ordered   3  Omega-3 Fish Oil CAPS; take 1 capsule daily; Therapy: (Recorded:22Nov2016) to Recorded   4  Osteo-Bi-Flex TABS; Take 1 tablet twice daily; Therapy: (Recorded:22Nov2016) to Recorded   5  Tylenol Extra Strength 500 MG TABS; TAKE 1 TABLET EVERY 4 TO 6 HOURS AS   NEEDED; Therapy: (Recorded:22Nov2016) to Recorded   6  Vitamin D 2000 UNIT Oral Capsule; take 1 capsule daily; Therapy: (Recorded:22Nov2016) to Recorded    Allergies    1  Morphine Derivatives   2  OxyCODONE HCl CAPS   3  Codeine   4   Gabapentin CAPS    Signatures   Electronically signed by : Symone Lagunas, ; Mar  1 2017 10:33AM EST                       (Author)

## 2018-01-18 NOTE — MISCELLANEOUS
Message   Recorded as Task   Date: 12/06/2016 10:34 AM, Created By: Krystle Herzog   Task Name: Care Coordination   Assigned To: [facility] [place] [facility]   Regarding Patient: Brittany Manzo, Status: Active   Comment:    Tim Awad - 06 Dec 2016 10:34 AM     TASK CREATED  f/u with dg as pain diary negative   Sean Medrano - 06 Dec 2016 2:24 PM     TASK EDITED  ***1 of 2 tasks running***   Sean Medrano - 07 Dec 2016 11:14 AM     TASK EDITED  Addressed in telephone note of 12/7/16        Active Problems    1  Arthritis (716 90) (M19 90)   2  Chronic low back pain (724 2,338 29) (M54 5,G89 29)   3  GERD (gastroesophageal reflux disease) (530 81) (K21 9)   4  Lumbar spondylosis (721 3) (M47 816)   5  Peripheral neuropathy (356 9) (G62 9)    Current Meds   1  CeleBREX 200 MG Oral Capsule (Celecoxib); TAKE 1 CAPSULE DAILY WITH A MEAL; Therapy: (Recorded:22Nov2016) to Recorded   2  Gabapentin 100 MG Oral Capsule; TAKE 3 CAPSULE Bedtime; Therapy: (Recorded:22Nov2016) to Recorded   3  Omega-3 Fish Oil CAPS; take 1 capsule daily; Therapy: (Recorded:22Nov2016) to Recorded   4  Osteo-Bi-Flex TABS; Take 1 tablet twice daily; Therapy: (Recorded:22Nov2016) to Recorded   5  Tylenol Extra Strength 500 MG TABS; TAKE 1 TABLET EVERY 4 TO 6 HOURS AS   NEEDED; Therapy: (Recorded:22Nov2016) to Recorded   6  Vitamin D 2000 UNIT Oral Capsule; take 1 capsule daily; Therapy: (Recorded:22Nov2016) to Recorded    Allergies    1  Morphine Derivatives   2   OxyCODONE HCl CAPS    Signatures   Electronically signed by : Uzair Sharif, ; Dec  7 2016 11:14AM EST                       (Author)

## 2018-01-22 VITALS
TEMPERATURE: 98.2 F | HEART RATE: 74 BPM | SYSTOLIC BLOOD PRESSURE: 120 MMHG | RESPIRATION RATE: 16 BRPM | DIASTOLIC BLOOD PRESSURE: 60 MMHG

## 2024-07-17 ENCOUNTER — CLINICAL SUPPORT (OUTPATIENT)
Dept: CARDIAC REHAB | Facility: HOSPITAL | Age: 79
End: 2024-07-17
Payer: MEDICARE

## 2024-07-17 DIAGNOSIS — Z98.890 HISTORY OF MITRAL VALVE REPAIR: Primary | ICD-10-CM

## 2024-07-17 NOTE — PROGRESS NOTES
CARDIAC REHABILITATION   ASSESSMENT AND INDIVIDUALIZED TREATMENT PLAN  INITIAL           Today's date: 2024   # of Exercise Sessions Completed: Initial Evaluation Today  Patient name: Charles Johnson      : 1945  Age: 79 y.o.       MRN: [id number]  Referring Physician: [facility]  Cardiologist: [facility] - send ITPs to [place]  Provider: Francis  Clinician: Jana Montero MS, CEP        Treatment is tailored to this patient's individual needs.  The ITP was reviewed with the patient and all questions were answered to their satisfaction.  Additional ITP documentation can be found electronically including daily and monthly exercise summaries, daily session notes with ECG summaries, education notes, daily medication reconciliation, and daily physician supervision.      Comments: Charles will begin up to 35 sessions of CR 2024. He reports no cardiac complaints since MVR/pacemaker.         Dx:   Encounter Diagnosis   Name Primary?    History of mitral valve repair Yes       Description of Diagnosis: Mitral Valve Repair May 2024  Date of onset: 2024  Other Cardiac History: PPM 2024, CAD s/p JANA mLAD ()        ASSESSMENT    Medical History:   No past medical history on file.    Family History:  No family history on file.    Allergies:   Patient has no allergy information on record.    Current Medications:   No current outpatient medications on file.     No current facility-administered medications for this visit.       Medication compliance: Yes   Comments: Pt reports to be compliant with medications    Physical Limitations: occasional back pain    Fall Risk: Low   Comments: Ambulates with a steady gait with no assist device    Cultural needs: none      CAD Risk Factors:  Cholesterol: No  HTN: No  DM: No  Obesity: No   Inactivity: No      EXERCISE ASSESSMENT:     Initial Fitness Assessment: Submaximal TM ETT:  Resting:  BP: 130/70  HR: 76, Exercise:  BP: 140/72  HR:  110, METs:  4.3, ECG Summary: NSR, and Test terminated at:  RHR +30      ECG INTERPRETATION:  NSR    Current Functional Status:  Occupation: retired  Recreation/Physical Activity: camping, fishing - has not done since surgery  ADL’s:resumed all ADLs  [place]: resumed all ADLs  Home exercise: Type: walking, sporadically, gradually increasing distance  Other Comments: n/a      SMART Exercise Goals:   10% improvement in functional capacity based on max METs achieved in initial fitness assessment  improved DASI score by 10%  increased exercise capacity by 40% based on peak METs tolerated in cardiac rehab exercise session  maintain > 150 minutes per week of moderate intensity exercise    Patient Specific EXERCISE GOALS:       Increase energy and stamina  Begin a consistent exercise regimen    Functional Capacity Screening Tool:  [facility] Activity Status Index:  7.99 METs      PSYCHOSOCIAL ASSESSMENT:    Date of last Assessment:  7/17/2024  Depression screening:  PHQ-9 = 1    Interpretation:  1-4 = Minimal Depression  Anxiety screening:  REY-7 = 0    Interpretation: 0-4  = Not anxious    Pt self-report of depression and anxiety   Patient reports they are coping well with good social support and denies depression or anxiety    Self-reported stress level:  1   Stressors:  no reported stressors  Stress Management Tools: spend time outside and enjoy a hobby    SMART Psychosocial Goals:     Physical Fitness in [facility] Score < 3    Patient Specific PSYCHOCOSOCIAL GOALS:    Continue to spend time with family  Get back to hobbies such as fishing and hunting    Quality of Life Screen:  (Higher score indicates disease impact on QOL)  [facility] COOP score: 17/45     Social Support:   spouse  Community/Social Activities: spending time with family     Psychosocial Assessment as it relates to rehabilitation:   Patient denies issues with his/her family or home life that may affect their rehabilitation efforts.       NUTRITION  "ASSESSMENT:    Initial Weight:  157.4 lbs  Current Weight:     Height:   Ht Readings from Last 1 Encounters:   10/02/17 6' 2\" (1.88 m)       Rate Your Plate Score: 53/81    Diabetes: N/A  A1c: not on file    last measured: not on file    Lipid management:  not on file    Current Dietary Habits:  Patient reports he does not watch anything specific in his diet and that he has not made any changes since surgery    SMART Nutrition Goals:   Improved Rate Your Plate score  >58    Patient Specific NUTRITION GOALS:     1. Gain 20 lbs back that was lost after surgery    Drug/Alcohol Use:   N/A      OTHER CORE COMPONENT ASSESSMENT:    Tobacco Use:     N/A: Pt has a remote history of smoking    Anginal Symptoms:  None   NTG use: No prescription    SMART Goals:   consistent, controlled resting BP < 130/80 and medication compliance    Patient Specific CORE COMPONENT GOALS:    Reduce episodes of lightheadedness with positional changes  Continue to monitor BP and keep WNL      INDIVIDUALIZED TREATMENT PLAN      EXERCISE GOALS and PLAN      Progress toward Exercise goals:   Reviewed Pt goals and determined plan of care, Will continue to educate and progress as tolerated.    Exercise Intervention:    education on home exercise guidelines and home exercise 30+ mins 2 days opposite CR    The patient was counseled on exercise guidelines to achieve a minimum of 150 mins/wk of moderate intensity (RPE 4-6)   exercise and encouraged to add 1-2 days of exercise on opposite days of cardiac rehab as tolerated.     Current Aerobic Exercise Prescription:      Frequency: 3 days/week   Supplement with home exercise 2+ days/wk as tolerated       Minutes: 20 - 40         METS: 3.5 - 4.2           HR: RHR +30-40bpm   RPE: 4-6         Modalities: Treadmill, UBE, Lifecycle, NuStep, and Recumbent bike     Exercise workloads will be progressed gradually as tolerated, within limits of patient's ability, and according to the patient's   response to the " exercise program.      Aerobic Exercise Prescription Plan for Progression   Frequency: 3 days/week of cardiac rehab       Supplement with home exercise 2+ days/wk as tolerated    Minutes: 40       >150 mins/wk of moderate intensity exercise   METS: 3.8 - 4.5   HR: RHR +30-40bpm     RPE: 4-6   Modalities: Treadmill, UBE, Lifecycle, NuStep, and Recumbent bike    Strength trainin-3 days / week  12-15 repetitions  1-2 sets per modality   Will be added following at least 8 weeks post surgery and 8-10 monitored sessions   Modalities: Pull Downs, Lateral Raise, Arm Extension, Arm Curl, and leg extensions    Home Exercise: Type: walking, inconsistently, has gradually been increasing distance    Exercise Education: benefit of exercise for CAD risk factors, home exercise guidelines, AHA guidelines to achieve >150 mins/wk of moderate exercise, and RPE scale     Readiness to change: Preparation:  (Getting ready to change)       NUTRITION GOALS AND PLAN      Nutritional   Reviewed patient's Rate your Plate. Discussed key elements of heart healthy eating. Reviewed patient goals for dietary modifications and their clinical implications.  Reviewed most recent lipid profile.     Patient's progress toward Nutrition goals:    Reviewed Pt goals and determined plan of care, Will continue to educate and progress as tolerated.      Nutrition Intervention:   group class: Reading Food Labels and group Class: Heart Healthy Eating    Measurable goals were based Rate Your Plate Dietary Self-Assessment. These are the areas in which the patient could score higher on the assessment.  Goals include recommendations for a heart healthy diet based on American Heart Association.    Nutrition Education:   heart healthy eating principles  low sodium diet  maintaining hydration  nutrition for  lipid management    Readiness to change: Preparation:  (Getting ready to change)       PSYCHOSOCIAL GOALS AND PLAN    Psychosocial Assessment as it relates to  rehabilitation:   Patient denies issues with his/her family or home life that may affect their rehabilitation efforts.     Patient's progress toward Psychosocial goals:    Reviewed Pt goals and determined plan of care, Will continue to educate and progress as tolerated.    Psychosocial Intervention:   Class: Stress and Your Health, Class: Relaxation, Spend time outdoors, and Enjoy a hobby such as fishing and camping    Psychosocial Education: benefits of a positive support system and stress management techniques    Information to utilize [employer] was provided as well as contact information for counseling through  Behavioral Health and group psychotherapy groups available.    Readiness to change: Preparation:  (Getting ready to change)       OTHER CORE COMPONENTS GOALS and PLAN      Blood Pressure will be monitored throughout the program and cardiologist will be notified of elevated trends.    Pt will be encouraged to monitor home BP if advised by cardiologist.    Tobacco Intervention:   N/A:  Pt has a remote history of smoking.      Progress toward Core Component goals:   Reviewed Pt goals and determined plan of care, Will continue to educate and progress as tolerated.    Other Core Components Intervention:   group class: Understanding Heart Disease, group class: Common Heart Medications, medication compliance, and monitor home BP    Group and Individual Education:  understanding high blood pressure and it's relationship to CAD and components of blood pressure management    Readiness to change: Preparation:  (Getting ready to change)

## 2024-07-19 ENCOUNTER — CLINICAL SUPPORT (OUTPATIENT)
Dept: CARDIAC REHAB | Facility: HOSPITAL | Age: 79
End: 2024-07-19
Payer: MEDICARE

## 2024-07-19 DIAGNOSIS — Z98.890 HISTORY OF MITRAL VALVE REPAIR: Primary | ICD-10-CM

## 2024-07-19 PROCEDURE — 93798 PHYS/QHP OP CAR RHAB W/ECG: CPT

## 2024-07-23 ENCOUNTER — CLINICAL SUPPORT (OUTPATIENT)
Dept: CARDIAC REHAB | Facility: HOSPITAL | Age: 79
End: 2024-07-23
Payer: MEDICARE

## 2024-07-23 DIAGNOSIS — Z98.890 HISTORY OF MITRAL VALVE REPAIR: Primary | ICD-10-CM

## 2024-07-23 PROCEDURE — 93798 PHYS/QHP OP CAR RHAB W/ECG: CPT

## 2024-07-25 ENCOUNTER — CLINICAL SUPPORT (OUTPATIENT)
Dept: CARDIAC REHAB | Facility: HOSPITAL | Age: 79
End: 2024-07-25
Payer: MEDICARE

## 2024-07-25 DIAGNOSIS — Z98.890 HISTORY OF MITRAL VALVE REPAIR: Primary | ICD-10-CM

## 2024-07-25 PROCEDURE — 93798 PHYS/QHP OP CAR RHAB W/ECG: CPT

## 2024-07-26 ENCOUNTER — CLINICAL SUPPORT (OUTPATIENT)
Dept: CARDIAC REHAB | Facility: HOSPITAL | Age: 79
End: 2024-07-26
Payer: MEDICARE

## 2024-07-26 DIAGNOSIS — Z98.890 HISTORY OF MITRAL VALVE REPAIR: Primary | ICD-10-CM

## 2024-07-26 PROCEDURE — 93798 PHYS/QHP OP CAR RHAB W/ECG: CPT

## 2024-07-30 ENCOUNTER — CLINICAL SUPPORT (OUTPATIENT)
Dept: CARDIAC REHAB | Facility: HOSPITAL | Age: 79
End: 2024-07-30
Payer: MEDICARE

## 2024-07-30 DIAGNOSIS — Z98.890 HISTORY OF MITRAL VALVE REPAIR: Primary | ICD-10-CM

## 2024-07-30 PROCEDURE — 93798 PHYS/QHP OP CAR RHAB W/ECG: CPT

## 2024-08-01 ENCOUNTER — CLINICAL SUPPORT (OUTPATIENT)
Dept: CARDIAC REHAB | Facility: HOSPITAL | Age: 79
End: 2024-08-01
Payer: MEDICARE

## 2024-08-01 DIAGNOSIS — Z98.890 HISTORY OF MITRAL VALVE REPAIR: Primary | ICD-10-CM

## 2024-08-01 PROCEDURE — 93798 PHYS/QHP OP CAR RHAB W/ECG: CPT

## 2024-08-02 ENCOUNTER — CLINICAL SUPPORT (OUTPATIENT)
Dept: CARDIAC REHAB | Facility: HOSPITAL | Age: 79
End: 2024-08-02
Payer: MEDICARE

## 2024-08-02 DIAGNOSIS — Z98.890 HISTORY OF MITRAL VALVE REPAIR: Primary | ICD-10-CM

## 2024-08-02 PROCEDURE — 93798 PHYS/QHP OP CAR RHAB W/ECG: CPT

## 2024-08-06 ENCOUNTER — CLINICAL SUPPORT (OUTPATIENT)
Dept: CARDIAC REHAB | Facility: HOSPITAL | Age: 79
End: 2024-08-06
Payer: MEDICARE

## 2024-08-06 DIAGNOSIS — Z98.890 HISTORY OF MITRAL VALVE REPAIR: Primary | ICD-10-CM

## 2024-08-06 PROCEDURE — 93798 PHYS/QHP OP CAR RHAB W/ECG: CPT

## 2024-08-08 ENCOUNTER — CLINICAL SUPPORT (OUTPATIENT)
Dept: CARDIAC REHAB | Facility: HOSPITAL | Age: 79
End: 2024-08-08
Payer: MEDICARE

## 2024-08-08 DIAGNOSIS — Z98.890 HISTORY OF MITRAL VALVE REPAIR: Primary | ICD-10-CM

## 2024-08-08 PROCEDURE — 93798 PHYS/QHP OP CAR RHAB W/ECG: CPT

## 2024-08-09 ENCOUNTER — CLINICAL SUPPORT (OUTPATIENT)
Dept: CARDIAC REHAB | Facility: HOSPITAL | Age: 79
End: 2024-08-09
Payer: MEDICARE

## 2024-08-09 DIAGNOSIS — Z98.890 HISTORY OF MITRAL VALVE REPAIR: Primary | ICD-10-CM

## 2024-08-09 PROCEDURE — 93798 PHYS/QHP OP CAR RHAB W/ECG: CPT

## 2024-08-13 ENCOUNTER — APPOINTMENT (OUTPATIENT)
Dept: CARDIAC REHAB | Facility: HOSPITAL | Age: 79
End: 2024-08-13
Payer: MEDICARE

## 2024-08-15 ENCOUNTER — CLINICAL SUPPORT (OUTPATIENT)
Dept: CARDIAC REHAB | Facility: HOSPITAL | Age: 79
End: 2024-08-15
Payer: MEDICARE

## 2024-08-15 DIAGNOSIS — Z98.890 HISTORY OF MITRAL VALVE REPAIR: Primary | ICD-10-CM

## 2024-08-15 PROCEDURE — 93798 PHYS/QHP OP CAR RHAB W/ECG: CPT

## 2024-08-15 NOTE — PROGRESS NOTES
CARDIAC REHABILITATION   ASSESSMENT AND INDIVIDUALIZED TREATMENT PLAN  30 DAY          Today's date: 8/15/2024   # of Exercise Sessions Completed: 12  Patient name: Charles Johnson      : 1945  Age: 79 y.o.       MRN: [id number]  Referring Physician: [facility]  Cardiologist: [facility] - send ITPs to [place]  Provider: Francis  Clinician: Jana Montero MS, CEP        Treatment is tailored to this patient's individual needs.  The ITP was reviewed with the patient and all questions were answered to their satisfaction.  Additional ITP documentation can be found electronically including daily and monthly exercise summaries, daily session notes with ECG summaries, education notes, daily medication reconciliation, and daily physician supervision.      Comments: Charles has attended 12 sessions of CR in the last 30 days and has no cardiac complaints at this time. He has been able to work up 3.9 METs for 40-55 minutes of aerobic exercise. He reports he is back to doing everything at home that he was doing prior to his surgery.         Dx:   Encounter Diagnosis   Name Primary?    History of mitral valve repair Yes       Description of Diagnosis: Mitral Valve Repair May 2024  Date of onset: 2024  Other Cardiac History: PPM 2024, CAD s/p JANA mLAD (2018)        ASSESSMENT    Medical History:   No past medical history on file.    Family History:  No family history on file.    Allergies:   Patient has no allergy information on record.    Current Medications:   No current outpatient medications on file.     No current facility-administered medications for this visit.       Medication compliance: Yes   Comments: Pt reports to be compliant with medications    Physical Limitations: occasional back pain    Fall Risk: Low   Comments: Ambulates with a steady gait with no assist device    Cultural needs: none      CAD Risk Factors:  Cholesterol: No  HTN: No  DM: No  Obesity: No   Inactivity:  No      EXERCISE ASSESSMENT:     Initial Fitness Assessment: Submaximal TM ETT:  Resting:  BP: 130/70  HR: 76, Exercise:  BP: 140/72  HR: 110, METs:  4.3, ECG Summary: NSR, and Test terminated at:  RHR +30      ECG INTERPRETATION:  NSR    Current Functional Status:  Occupation: retired  Recreation/Physical Activity: camping, fishing - has not done since surgery  ADL’s:resumed all ADLs  [place]: resumed all ADLs  Home exercise: Type: walking, sporadically, gradually increasing distance  Other Comments: n/a      SMART Exercise Goals:   10% improvement in functional capacity based on max METs achieved in initial fitness assessment  improved DASI score by 10%  increased exercise capacity by 40% based on peak METs tolerated in cardiac rehab exercise session  maintain > 150 minutes per week of moderate intensity exercise    Patient Specific EXERCISE GOALS:       Increase energy and stamina  Begin a consistent exercise regimen    Functional Capacity Screening Tool:  [facility] Activity Status Index:  7.99 METs      PSYCHOSOCIAL ASSESSMENT:    Date of last Assessment:  7/17/2024  Depression screening:  PHQ-9 = 1    Interpretation:  1-4 = Minimal Depression  Anxiety screening:  REY-7 = 0    Interpretation: 0-4  = Not anxious    Pt self-report of depression and anxiety   Patient reports they are coping well with good social support and denies depression or anxiety    Self-reported stress level:  1   Stressors:  no reported stressors  Stress Management Tools: spend time outside and enjoy a hobby    SMART Psychosocial Goals:     Physical Fitness in [facility] Score < 3    Patient Specific PSYCHOCOSOCIAL GOALS:    Continue to spend time with family  Get back to hobbies such as fishing and hunting    Quality of Life Screen:  (Higher score indicates disease impact on QOL)  [facility] COOP score: 17/45     Social Support:   spouse  Community/Social Activities: spending time with family     Psychosocial Assessment as it relates to  "rehabilitation:   Patient denies issues with his/her family or home life that may affect their rehabilitation efforts.       NUTRITION ASSESSMENT:    Initial Weight:  157.4 lbs  Current Weight: 161.0 lbs    Height:   Ht Readings from Last 1 Encounters:   10/02/17 6' 2\" (1.88 m)       Rate Your Plate Score: 53/81    Diabetes: N/A  A1c: not on file    last measured: not on file    Lipid management:  not on file    Current Dietary Habits:  Patient reports he does not watch anything specific in his diet and that he has not made any changes since surgery    SMART Nutrition Goals:   Improved Rate Your Plate score  >58    Patient Specific NUTRITION GOALS:     1. Gain 20 lbs back that was lost after surgery    Drug/Alcohol Use:   N/A      OTHER CORE COMPONENT ASSESSMENT:    Tobacco Use:     N/A: Pt has a remote history of smoking    Anginal Symptoms:  None   NTG use: No prescription    SMART Goals:   consistent, controlled resting BP < 130/80 and medication compliance    Patient Specific CORE COMPONENT GOALS:    Reduce episodes of lightheadedness with positional changes  Continue to monitor BP and keep WNL      INDIVIDUALIZED TREATMENT PLAN      EXERCISE GOALS and PLAN      Progress toward Exercise goals:   Pt is progressing and showing improvement  toward the following goals:  increase in energy and stamina.  , Pt has not made progress toward the following goals: begin consistent exercise regimen outside of rehab. , Will continue to educate and progress as tolerated.    Exercise Intervention:    education on home exercise guidelines and home exercise 30+ mins 2 days opposite CR    The patient was counseled on exercise guidelines to achieve a minimum of 150 mins/wk of moderate intensity (RPE 4-6)   exercise and encouraged to add 1-2 days of exercise on opposite days of cardiac rehab as tolerated.     Current Aerobic Exercise Prescription:      Frequency: 3 days/week   Supplement with home exercise 2+ days/wk as tolerated "       Minutes: 20 - 40         METS: 3.2 - 3.9           HR: RHR +30-40bpm   RPE: 4-6         Modalities: Treadmill, UBE, Lifecycle, NuStep, and Recumbent bike     Exercise workloads will be progressed gradually as tolerated, within limits of patient's ability, and according to the patient's   response to the exercise program.      Aerobic Exercise Prescription Plan for Progression   Frequency: 3 days/week of cardiac rehab       Supplement with home exercise 2+ days/wk as tolerated    Minutes: 40       >150 mins/wk of moderate intensity exercise   METS: 3.5 - 4.2   HR: RHR +30-40bpm     RPE: 4-6   Modalities: Treadmill, UBE, Lifecycle, NuStep, and Recumbent bike    Strength trainin-3 days / week  12-15 repetitions  1-2 sets per modality   Will be added following at least 8 weeks post surgery and 8-10 monitored sessions   Modalities: Pull Downs, Lateral Raise, Arm Extension, Arm Curl, and leg extensions    Home Exercise: Type: walking, inconsistently, has gradually been increasing distance    Exercise Education: benefit of exercise for CAD risk factors, home exercise guidelines, AHA guidelines to achieve >150 mins/wk of moderate exercise, and RPE scale     Readiness to change: Preparation:  (Getting ready to change)  and Action:  (Changing behavior)      NUTRITION GOALS AND PLAN      Nutritional   Reviewed patient's Rate your Plate. Discussed key elements of heart healthy eating. Reviewed patient goals for dietary modifications and their clinical implications.  Reviewed most recent lipid profile.     Patient's progress toward Nutrition goals:    Pt is progressing and showing improvement  toward the following goals:  3.6 lb weight gain since beginning rehab, making heart healthy diet choices.  , Will continue to educate and progress as tolerated.      Nutrition Intervention:   group class: Reading Food Labels and group Class: Heart Healthy Eating    Measurable goals were based Rate Your Plate Dietary  Self-Assessment. These are the areas in which the patient could score higher on the assessment.  Goals include recommendations for a heart healthy diet based on American Heart Association.    Nutrition Education:   heart healthy eating principles  low sodium diet  maintaining hydration  nutrition for  lipid management    Readiness to change: Action:  (Changing behavior)      PSYCHOSOCIAL GOALS AND PLAN    Psychosocial Assessment as it relates to rehabilitation:   Patient denies issues with his/her family or home life that may affect their rehabilitation efforts.     Patient's progress toward Psychosocial goals:    Pt is progressing and showing improvement  toward the following goals:  back to enjoying hobbies, spending time with family.  , Will continue to educate and progress as tolerated.    Psychosocial Intervention:   Class: Stress and Your Health, Class: Relaxation, Spend time outdoors, and Enjoy a hobby such as fishing and camping    Psychosocial Education: benefits of a positive support system and stress management techniques    Information to utilize [employer] was provided as well as contact information for counseling through  Behavioral Health and group psychotherapy groups available.    Readiness to change: Action:  (Changing behavior)      OTHER CORE COMPONENTS GOALS and PLAN      Blood Pressure will be monitored throughout the program and cardiologist will be notified of elevated trends.    Pt will be encouraged to monitor home BP if advised by cardiologist.    Tobacco Intervention:   N/A:  Pt has a remote history of smoking.      Progress toward Core Component goals:   Pt is progressing and showing improvement  toward the following goals:  BP WNL at CR, reports less lightheadedness with dietary changes.  , Will continue to educate and progress as tolerated.    Other Core Components Intervention:   group class: Understanding Heart Disease, group class: Common Heart Medications, medication  compliance, and monitor home BP    Group and Individual Education:  understanding high blood pressure and it's relationship to CAD and components of blood pressure management    Readiness to change: Action:  (Changing behavior)

## 2024-08-16 ENCOUNTER — CLINICAL SUPPORT (OUTPATIENT)
Dept: CARDIAC REHAB | Facility: HOSPITAL | Age: 79
End: 2024-08-16
Payer: MEDICARE

## 2024-08-16 DIAGNOSIS — Z98.890 HISTORY OF MITRAL VALVE REPAIR: Primary | ICD-10-CM

## 2024-08-16 PROCEDURE — 93798 PHYS/QHP OP CAR RHAB W/ECG: CPT

## 2024-08-20 ENCOUNTER — CLINICAL SUPPORT (OUTPATIENT)
Dept: CARDIAC REHAB | Facility: HOSPITAL | Age: 79
End: 2024-08-20
Payer: MEDICARE

## 2024-08-20 DIAGNOSIS — Z98.890 HISTORY OF MITRAL VALVE REPAIR: Primary | ICD-10-CM

## 2024-08-20 PROCEDURE — 93798 PHYS/QHP OP CAR RHAB W/ECG: CPT

## 2024-08-22 ENCOUNTER — CLINICAL SUPPORT (OUTPATIENT)
Dept: CARDIAC REHAB | Facility: HOSPITAL | Age: 79
End: 2024-08-22
Payer: MEDICARE

## 2024-08-22 DIAGNOSIS — Z98.890 HISTORY OF MITRAL VALVE REPAIR: Primary | ICD-10-CM

## 2024-08-22 PROCEDURE — 93798 PHYS/QHP OP CAR RHAB W/ECG: CPT

## 2024-08-23 ENCOUNTER — CLINICAL SUPPORT (OUTPATIENT)
Dept: CARDIAC REHAB | Facility: HOSPITAL | Age: 79
End: 2024-08-23
Payer: MEDICARE

## 2024-08-23 DIAGNOSIS — Z98.890 HISTORY OF MITRAL VALVE REPAIR: Primary | ICD-10-CM

## 2024-08-23 PROCEDURE — 93798 PHYS/QHP OP CAR RHAB W/ECG: CPT

## 2024-08-27 ENCOUNTER — CLINICAL SUPPORT (OUTPATIENT)
Dept: CARDIAC REHAB | Facility: HOSPITAL | Age: 79
End: 2024-08-27
Payer: MEDICARE

## 2024-08-27 DIAGNOSIS — Z98.890 HISTORY OF MITRAL VALVE REPAIR: Primary | ICD-10-CM

## 2024-08-27 PROCEDURE — 93798 PHYS/QHP OP CAR RHAB W/ECG: CPT

## 2024-08-29 ENCOUNTER — CLINICAL SUPPORT (OUTPATIENT)
Dept: CARDIAC REHAB | Facility: HOSPITAL | Age: 79
End: 2024-08-29
Payer: MEDICARE

## 2024-08-29 DIAGNOSIS — Z98.890 HISTORY OF MITRAL VALVE REPAIR: Primary | ICD-10-CM

## 2024-08-29 PROCEDURE — 93798 PHYS/QHP OP CAR RHAB W/ECG: CPT

## 2024-08-30 ENCOUNTER — CLINICAL SUPPORT (OUTPATIENT)
Dept: CARDIAC REHAB | Facility: HOSPITAL | Age: 79
End: 2024-08-30
Payer: MEDICARE

## 2024-08-30 DIAGNOSIS — Z98.890 HISTORY OF MITRAL VALVE REPAIR: Primary | ICD-10-CM

## 2024-08-30 PROCEDURE — 93798 PHYS/QHP OP CAR RHAB W/ECG: CPT

## 2024-09-03 ENCOUNTER — CLINICAL SUPPORT (OUTPATIENT)
Dept: CARDIAC REHAB | Facility: HOSPITAL | Age: 79
End: 2024-09-03
Payer: MEDICARE

## 2024-09-03 DIAGNOSIS — Z98.890 HISTORY OF MITRAL VALVE REPAIR: Primary | ICD-10-CM

## 2024-09-03 PROCEDURE — 93798 PHYS/QHP OP CAR RHAB W/ECG: CPT

## 2024-09-05 ENCOUNTER — CLINICAL SUPPORT (OUTPATIENT)
Dept: CARDIAC REHAB | Facility: HOSPITAL | Age: 79
End: 2024-09-05
Payer: MEDICARE

## 2024-09-05 DIAGNOSIS — Z98.890 HISTORY OF MITRAL VALVE REPAIR: Primary | ICD-10-CM

## 2024-09-05 PROCEDURE — 93798 PHYS/QHP OP CAR RHAB W/ECG: CPT

## 2024-09-06 ENCOUNTER — CLINICAL SUPPORT (OUTPATIENT)
Dept: CARDIAC REHAB | Facility: HOSPITAL | Age: 79
End: 2024-09-06
Payer: MEDICARE

## 2024-09-06 DIAGNOSIS — Z98.890 HISTORY OF MITRAL VALVE REPAIR: Primary | ICD-10-CM

## 2024-09-06 PROCEDURE — 93798 PHYS/QHP OP CAR RHAB W/ECG: CPT

## 2024-09-10 ENCOUNTER — CLINICAL SUPPORT (OUTPATIENT)
Dept: CARDIAC REHAB | Facility: HOSPITAL | Age: 79
End: 2024-09-10
Payer: MEDICARE

## 2024-09-10 DIAGNOSIS — Z98.890 HISTORY OF MITRAL VALVE REPAIR: Primary | ICD-10-CM

## 2024-09-10 PROCEDURE — 93798 PHYS/QHP OP CAR RHAB W/ECG: CPT

## 2024-09-12 ENCOUNTER — APPOINTMENT (OUTPATIENT)
Dept: CARDIAC REHAB | Facility: HOSPITAL | Age: 79
End: 2024-09-12
Payer: MEDICARE

## 2024-09-12 NOTE — PROGRESS NOTES
CARDIAC REHABILITATION   ASSESSMENT AND INDIVIDUALIZED TREATMENT PLAN  60 DAY          Today's date: 2024   # of Exercise Sessions Completed: 23  Patient name: Charles Johnson      : 1945  Age: 79 y.o.       MRN: [id number]  Referring Physician: [facility]  Cardiologist: [facility] - send ITPs to [place]  Provider: Francis  Clinician: Lana Lopez MS, CEP          Treatment is tailored to this patient's individual needs.  The ITP was reviewed with the patient and all questions were answered to their satisfaction.  Additional ITP documentation can be found electronically including daily and monthly exercise summaries, daily session notes with ECG summaries, education notes, daily medication reconciliation, and daily physician supervision.      Comments: Charles has attended 23 sessions of CR in the last 60 days and has no cardiac complaints at this time. He has been able to work up 4.7-5.1 METs for 50-60 minutes of aerobic exercise. He reports he is back to doing everything at home that he was doing prior to his surgery.         Dx:   Encounter Diagnosis   Name Primary?    History of mitral valve repair Yes       Description of Diagnosis: Mitral Valve Repair May 2024  Date of onset: 2024  Other Cardiac History: PPM 2024, CAD s/p JANA mLAD (2018)        ASSESSMENT    Medical History:   No past medical history on file.    Family History:  No family history on file.    Allergies:   Patient has no allergy information on record.    Current Medications:   No current outpatient medications on file.     No current facility-administered medications for this visit.       Medication compliance: Yes   Comments: Pt reports to be compliant with medications    Physical Limitations: occasional back pain    Fall Risk: Low   Comments: Ambulates with a steady gait with no assist device    Cultural needs: none      CAD Risk Factors:  Cholesterol: No  HTN: No  DM: No  Obesity: No    Inactivity: No      EXERCISE ASSESSMENT:     Initial Fitness Assessment: Submaximal TM ETT:  Resting:  BP: 130/70  HR: 76, Exercise:  BP: 140/72  HR: 110, METs:  4.3, ECG Summary: NSR, and Test terminated at:  RHR +30      ECG INTERPRETATION:  NSR    Current Functional Status:  Occupation: retired  Recreation/Physical Activity: camping, fishing - has not done since surgery  ADL’s:resumed all ADLs  [place]: resumed all ADLs  Home exercise: Type: walking, sporadically, gradually increasing distance  Other Comments: n/a      SMART Exercise Goals:   10% improvement in functional capacity based on max METs achieved in initial fitness assessment  improved DASI score by 10%  increased exercise capacity by 40% based on peak METs tolerated in cardiac rehab exercise session  maintain > 150 minutes per week of moderate intensity exercise    Patient Specific EXERCISE GOALS:       Increase energy and stamina  Begin a consistent exercise regimen    Functional Capacity Screening Tool:  [facility] Activity Status Index:  7.99 METs      PSYCHOSOCIAL ASSESSMENT:    Date of last Assessment:  7/17/2024  Depression screening:  PHQ-9 = 1    Interpretation:  1-4 = Minimal Depression  Anxiety screening:  REY-7 = 0    Interpretation: 0-4  = Not anxious    Pt self-report of depression and anxiety   Patient reports they are coping well with good social support and denies depression or anxiety    Self-reported stress level:  1   Stressors:  no reported stressors  Stress Management Tools: spend time outside and enjoy a hobby    SMART Psychosocial Goals:     Physical Fitness in [facility] Score < 3    Patient Specific PSYCHOCOSOCIAL GOALS:    Continue to spend time with family  Get back to hobbies such as fishing and hunting    Quality of Life Screen:  (Higher score indicates disease impact on QOL)  [facility] COOP score: 17/45     Social Support:   spouse  Community/Social Activities: spending time with family     Psychosocial Assessment as it  "relates to rehabilitation:   Patient denies issues with his/her family or home life that may affect their rehabilitation efforts.       NUTRITION ASSESSMENT:    Initial Weight:  157.4 lbs  Current Weight: 161.2 lb    Height:   Ht Readings from Last 1 Encounters:   10/02/17 6' 2\" (1.88 m)       Rate Your Plate Score: 53/81    Diabetes: N/A  A1c: not on file    last measured: not on file    Lipid management:  not on file    Current Dietary Habits:  Patient reports he does not watch anything specific in his diet and that he has not made any changes since surgery    SMART Nutrition Goals:   Improved Rate Your Plate score  >58    Patient Specific NUTRITION GOALS:     1. Gain 20 lbs back that was lost after surgery    Drug/Alcohol Use:   N/A      OTHER CORE COMPONENT ASSESSMENT:    Tobacco Use:     N/A: Pt has a remote history of smoking    Anginal Symptoms:  None   NTG use: No prescription    SMART Goals:   consistent, controlled resting BP < 130/80 and medication compliance    Patient Specific CORE COMPONENT GOALS:    Reduce episodes of lightheadedness with positional changes  Continue to monitor BP and keep WNL      INDIVIDUALIZED TREATMENT PLAN      EXERCISE GOALS and PLAN      Progress toward Exercise goals:   Pt is progressing and showing improvement  toward the following goals:  increase in energy and stamina.  , Pt has not made progress toward the following goals: begin consistent exercise regimen outside of rehab. , Will continue to educate and progress as tolerated.    Exercise Intervention:    education on home exercise guidelines and home exercise 30+ mins 2 days opposite CR    The patient was counseled on exercise guidelines to achieve a minimum of 150 mins/wk of moderate intensity (RPE 4-6)   exercise and encouraged to add 1-2 days of exercise on opposite days of cardiac rehab as tolerated.     Current Aerobic Exercise Prescription:      Frequency: 3 days/week   Supplement with home exercise 2+ days/wk as " tolerated       Minutes: 50-60         METS: 4.7-5.1           HR: RHR +30-40bpm   RPE: 4-6         Modalities: Treadmill, UBE, Lifecycle, NuStep, and Recumbent bike     Exercise workloads will be progressed gradually as tolerated, within limits of patient's ability, and according to the patient's   response to the exercise program.      Aerobic Exercise Prescription Plan for Progression   Frequency: 3 days/week of cardiac rehab       Supplement with home exercise 2+ days/wk as tolerated    Minutes: 50-60       >150 mins/wk of moderate intensity exercise   METS: 5.0-6.0   HR: RHR +30-40bpm     RPE: 4-6   Modalities: Treadmill, UBE, Lifecycle, NuStep, and Recumbent bike    Strength trainin-3 days / week  12-15 repetitions  1-2 sets per modality   Will be added following at least 8 weeks post surgery and 8-10 monitored sessions   Modalities: Pull Downs, Lateral Raise, Arm Extension, Arm Curl, and leg extensions    Home Exercise: Type: walking, inconsistently, has gradually been increasing distance    Exercise Education: benefit of exercise for CAD risk factors, home exercise guidelines, AHA guidelines to achieve >150 mins/wk of moderate exercise, and RPE scale     Readiness to change: Preparation:  (Getting ready to change)  and Action:  (Changing behavior)      NUTRITION GOALS AND PLAN      Nutritional   Reviewed patient's Rate your Plate. Discussed key elements of heart healthy eating. Reviewed patient goals for dietary modifications and their clinical implications.  Reviewed most recent lipid profile.     Patient's progress toward Nutrition goals:    Pt is progressing and showing improvement  toward the following goals:  3.6 lb weight gain since beginning rehab, making heart healthy diet choices.  , Will continue to educate and progress as tolerated.      Nutrition Intervention:   group class: Reading Food Labels and group Class: Heart Healthy Eating    Measurable goals were based Rate Your Plate Dietary  Self-Assessment. These are the areas in which the patient could score higher on the assessment.  Goals include recommendations for a heart healthy diet based on American Heart Association.    Nutrition Education:   heart healthy eating principles  low sodium diet  maintaining hydration  nutrition for  lipid management    Readiness to change: Action:  (Changing behavior)      PSYCHOSOCIAL GOALS AND PLAN    Psychosocial Assessment as it relates to rehabilitation:   Patient denies issues with his/her family or home life that may affect their rehabilitation efforts.     Patient's progress toward Psychosocial goals:    Pt is progressing and showing improvement  toward the following goals:  back to enjoying hobbies, spending time with family.  , Will continue to educate and progress as tolerated.    Psychosocial Intervention:   Class: Stress and Your Health, Class: Relaxation, Spend time outdoors, and Enjoy a hobby such as fishing and camping    Psychosocial Education: benefits of a positive support system and stress management techniques    Information to utilize [employer] was provided as well as contact information for counseling through  Behavioral Health and group psychotherapy groups available.    Readiness to change: Action:  (Changing behavior)      OTHER CORE COMPONENTS GOALS and PLAN      Blood Pressure will be monitored throughout the program and cardiologist will be notified of elevated trends.    Pt will be encouraged to monitor home BP if advised by cardiologist.    Tobacco Intervention:   N/A:  Pt has a remote history of smoking.      Progress toward Core Component goals:   Pt is progressing and showing improvement  toward the following goals:  BP WNL at CR, reports less lightheadedness with dietary changes.  , Will continue to educate and progress as tolerated.    Other Core Components Intervention:   group class: Understanding Heart Disease, group class: Common Heart Medications, medication  compliance, and monitor home BP    Group and Individual Education:  understanding high blood pressure and it's relationship to CAD and components of blood pressure management    Readiness to change: Action:  (Changing behavior)

## 2024-09-13 ENCOUNTER — CLINICAL SUPPORT (OUTPATIENT)
Dept: CARDIAC REHAB | Facility: HOSPITAL | Age: 79
End: 2024-09-13
Payer: MEDICARE

## 2024-09-13 DIAGNOSIS — Z98.890 HISTORY OF MITRAL VALVE REPAIR: Primary | ICD-10-CM

## 2024-09-13 PROCEDURE — 93798 PHYS/QHP OP CAR RHAB W/ECG: CPT

## 2024-09-17 ENCOUNTER — CLINICAL SUPPORT (OUTPATIENT)
Dept: CARDIAC REHAB | Facility: HOSPITAL | Age: 79
End: 2024-09-17
Payer: MEDICARE

## 2024-09-17 DIAGNOSIS — Z98.890 HISTORY OF MITRAL VALVE REPAIR: Primary | ICD-10-CM

## 2024-09-17 PROCEDURE — 93798 PHYS/QHP OP CAR RHAB W/ECG: CPT

## 2024-09-19 ENCOUNTER — CLINICAL SUPPORT (OUTPATIENT)
Dept: CARDIAC REHAB | Facility: HOSPITAL | Age: 79
End: 2024-09-19
Payer: MEDICARE

## 2024-09-19 DIAGNOSIS — Z98.890 HISTORY OF MITRAL VALVE REPAIR: Primary | ICD-10-CM

## 2024-09-19 PROCEDURE — 93798 PHYS/QHP OP CAR RHAB W/ECG: CPT

## 2024-09-20 ENCOUNTER — CLINICAL SUPPORT (OUTPATIENT)
Dept: CARDIAC REHAB | Facility: HOSPITAL | Age: 79
End: 2024-09-20
Payer: MEDICARE

## 2024-09-20 DIAGNOSIS — Z98.890 HISTORY OF MITRAL VALVE REPAIR: Primary | ICD-10-CM

## 2024-09-20 PROCEDURE — 93798 PHYS/QHP OP CAR RHAB W/ECG: CPT

## 2024-09-24 ENCOUNTER — CLINICAL SUPPORT (OUTPATIENT)
Dept: CARDIAC REHAB | Facility: HOSPITAL | Age: 79
End: 2024-09-24
Payer: MEDICARE

## 2024-09-24 DIAGNOSIS — Z98.890 HISTORY OF MITRAL VALVE REPAIR: Primary | ICD-10-CM

## 2024-09-24 PROCEDURE — 93798 PHYS/QHP OP CAR RHAB W/ECG: CPT

## 2024-09-26 ENCOUNTER — CLINICAL SUPPORT (OUTPATIENT)
Dept: CARDIAC REHAB | Facility: HOSPITAL | Age: 79
End: 2024-09-26
Payer: MEDICARE

## 2024-09-26 DIAGNOSIS — Z98.890 HISTORY OF MITRAL VALVE REPAIR: Primary | ICD-10-CM

## 2024-09-26 PROCEDURE — 93798 PHYS/QHP OP CAR RHAB W/ECG: CPT

## 2024-09-27 ENCOUNTER — CLINICAL SUPPORT (OUTPATIENT)
Dept: CARDIAC REHAB | Facility: HOSPITAL | Age: 79
End: 2024-09-27
Payer: MEDICARE

## 2024-09-27 DIAGNOSIS — Z98.890 HISTORY OF MITRAL VALVE REPAIR: Primary | ICD-10-CM

## 2024-09-27 PROCEDURE — 93798 PHYS/QHP OP CAR RHAB W/ECG: CPT

## 2024-10-01 ENCOUNTER — CLINICAL SUPPORT (OUTPATIENT)
Dept: CARDIAC REHAB | Facility: HOSPITAL | Age: 79
End: 2024-10-01
Payer: MEDICARE

## 2024-10-01 DIAGNOSIS — Z98.890 HISTORY OF MITRAL VALVE REPAIR: Primary | ICD-10-CM

## 2024-10-01 PROCEDURE — 93798 PHYS/QHP OP CAR RHAB W/ECG: CPT

## 2024-10-03 ENCOUNTER — CLINICAL SUPPORT (OUTPATIENT)
Dept: CARDIAC REHAB | Facility: HOSPITAL | Age: 79
End: 2024-10-03
Payer: MEDICARE

## 2024-10-03 DIAGNOSIS — Z98.890 HISTORY OF MITRAL VALVE REPAIR: Primary | ICD-10-CM

## 2024-10-03 PROCEDURE — 93798 PHYS/QHP OP CAR RHAB W/ECG: CPT

## 2024-10-04 ENCOUNTER — CLINICAL SUPPORT (OUTPATIENT)
Dept: CARDIAC REHAB | Facility: HOSPITAL | Age: 79
End: 2024-10-04
Payer: MEDICARE

## 2024-10-04 DIAGNOSIS — Z98.890 HISTORY OF MITRAL VALVE REPAIR: Primary | ICD-10-CM

## 2024-10-04 PROCEDURE — 93798 PHYS/QHP OP CAR RHAB W/ECG: CPT

## 2024-10-08 ENCOUNTER — CLINICAL SUPPORT (OUTPATIENT)
Dept: CARDIAC REHAB | Facility: HOSPITAL | Age: 79
End: 2024-10-08
Payer: MEDICARE

## 2024-10-08 DIAGNOSIS — Z98.890 HISTORY OF MITRAL VALVE REPAIR: Primary | ICD-10-CM

## 2024-10-08 PROCEDURE — 93798 PHYS/QHP OP CAR RHAB W/ECG: CPT

## 2024-10-10 ENCOUNTER — CLINICAL SUPPORT (OUTPATIENT)
Dept: CARDIAC REHAB | Facility: HOSPITAL | Age: 79
End: 2024-10-10
Payer: MEDICARE

## 2024-10-10 DIAGNOSIS — Z98.890 HISTORY OF MITRAL VALVE REPAIR: Primary | ICD-10-CM

## 2024-10-10 PROCEDURE — 93798 PHYS/QHP OP CAR RHAB W/ECG: CPT

## 2024-10-11 ENCOUNTER — CLINICAL SUPPORT (OUTPATIENT)
Dept: CARDIAC REHAB | Facility: HOSPITAL | Age: 79
End: 2024-10-11
Payer: MEDICARE

## 2024-10-11 DIAGNOSIS — Z98.890 HISTORY OF MITRAL VALVE REPAIR: Primary | ICD-10-CM

## 2024-10-11 PROCEDURE — 93798 PHYS/QHP OP CAR RHAB W/ECG: CPT

## 2024-10-11 NOTE — PROGRESS NOTES
"CARDIAC REHABILITATION   ASSESSMENT AND INDIVIDUALIZED TREATMENT PLAN  60 DAY          Today's date: 10/11/2024   # of Exercise Sessions Completed: 36  Patient name: Charles Johnson      : 1945  Age: 79 y.o.       MRN: [id number]  Referring Physician: [facility]  Cardiologist: [facility] - send ITPs to [place]  Provider: Francis  Clinician: Jana Montero MS, CEP          Treatment is tailored to this patient's individual needs.  The ITP was reviewed with the patient and all questions were answered to their satisfaction.  Additional ITP documentation can be found electronically including daily and monthly exercise summaries, daily session notes with ECG summaries, education notes, daily medication reconciliation, and daily physician supervision.      Comments: Charles \"Bill\" has attended 36 sessions of CR and is being discharged at this time. He has no cardiac complaints at this time and has been progressing well. Following discharge he plans to continue exercise using his \"home gym\" following a similar routine from rehab. He has returned to doing things outdoors such as camping which was one of his goals. He has not returned to fishing but plans to when the season comes back around. He also plans to return to golfing shortly. Another goal was to gain weight back following his surgery since he was down about 40 lbs, he has gained 8 lbs since beginning rehab.         Dx:   Encounter Diagnosis   Name Primary?    History of mitral valve repair Yes       Description of Diagnosis: Mitral Valve Repair May 2024  Date of onset: 2024  Other Cardiac History: PPM 2024, CAD s/p JANA mLAD (2018)        ASSESSMENT    Medical History:   No past medical history on file.    Family History:  No family history on file.    Allergies:   Patient has no allergy information on record.    Current Medications:   No current outpatient medications on file.     No current facility-administered medications " for this visit.       Medication compliance: Yes   Comments: Pt reports to be compliant with medications    Physical Limitations: occasional back pain    Fall Risk: Low   Comments: Ambulates with a steady gait with no assist device    Cultural needs: none      CAD Risk Factors:  Cholesterol: No  HTN: No  DM: No  Obesity: No   Inactivity: No      EXERCISE ASSESSMENT:     Initial Fitness Assessment: Submaximal TM ETT:  Resting:  BP: 130/70  HR: 46, Exercise:  BP: 142/68  HR: 108, METs:  4.3, ECG Summary: NSR, and Test terminated at:  RHR +30      ECG INTERPRETATION:  NSR    Current Functional Status:  Occupation: retired  Recreation/Physical Activity: camping, fishing   ADL’s:resumed all ADLs  [place]: resumed all ADLs  Home exercise: Type: walking, sporadically, gradually increasing distance  Other Comments: n/a      SMART Exercise Goals:   10% improvement in functional capacity based on max METs achieved in initial fitness assessment  improved DASI score by 10%  increased exercise capacity by 40% based on peak METs tolerated in cardiac rehab exercise session  maintain > 150 minutes per week of moderate intensity exercise    Patient Specific EXERCISE GOALS:       Increase energy and stamina  Begin a consistent exercise regimen    Functional Capacity Screening Tool:  [facility] Activity Status Index:  7.99 METs      PSYCHOSOCIAL ASSESSMENT:    Date of last Assessment:  7/17/2024  Depression screening:  PHQ-9 = 1    Interpretation:  1-4 = Minimal Depression  Anxiety screening:  REY-7 = 0    Interpretation: 0-4  = Not anxious    Pt self-report of depression and anxiety   Patient reports they are coping well with good social support and denies depression or anxiety    Self-reported stress level:  1   Stressors:  no reported stressors  Stress Management Tools: spend time outside and enjoy a hobby    SMART Psychosocial Goals:     Physical Fitness in [facility] Score < 3    Patient Specific PSYCHOCOSOCIAL GOALS:    Continue  "to spend time with family  Get back to hobbies such as fishing and hunting    Quality of Life Screen:  (Higher score indicates disease impact on QOL)  [facility] COOP score: 17/45     Social Support:   spouse  Community/Social Activities: spending time with family     Psychosocial Assessment as it relates to rehabilitation:   Patient denies issues with his/her family or home life that may affect their rehabilitation efforts.       NUTRITION ASSESSMENT:    Initial Weight:  157.4 lbs  Current Weight: 165.4 lb    Height:   Ht Readings from Last 1 Encounters:   10/02/17 6' 2\" (1.88 m)       Rate Your Plate Score: 53/81    Diabetes: N/A  A1c: not on file    last measured: not on file    Lipid management:  not on file    Current Dietary Habits:  Patient reports he does not watch anything specific in his diet and that he has not made any changes since surgery    SMART Nutrition Goals:   Improved Rate Your Plate score  >58    Patient Specific NUTRITION GOALS:     1. Gain 20 lbs back that was lost after surgery    Drug/Alcohol Use:   N/A      OTHER CORE COMPONENT ASSESSMENT:    Tobacco Use:     N/A: Pt has a remote history of smoking    Anginal Symptoms:  None   NTG use: No prescription    SMART Goals:   consistent, controlled resting BP < 130/80 and medication compliance    Patient Specific CORE COMPONENT GOALS:    Reduce episodes of lightheadedness with positional changes  Continue to monitor BP and keep WNL      INDIVIDUALIZED TREATMENT PLAN      EXERCISE GOALS and PLAN      Progress toward Exercise goals:   Goals met: increase in energy and stamina, exercise routine established, 42.9% improvement in peak METs based on max METs in CR, exercise >150 min/wk, DASI score remained the same at highest score possible., Patient will be encouraged to focus on lifestyle modifications following discharge.    Exercise Intervention:    education on home exercise guidelines and home exercise 30+ mins 2 days opposite CR    The patient " was counseled on exercise guidelines to achieve a minimum of 150 mins/wk of moderate intensity (RPE 4-6)   exercise and encouraged to add 1-2 days of exercise on opposite days of cardiac rehab as tolerated.     Current Aerobic Exercise Prescription:      Frequency: 3 days/week   Supplement with home exercise 2+ days/wk as tolerated       Minutes: 50-60         METS: 4.7-5.1           HR: RHR +30-40bpm   RPE: 4-6         Modalities: Treadmill, UBE, Lifecycle, NuStep, and Recumbent bike     Exercise workloads will be progressed gradually as tolerated, within limits of patient's ability, and according to the patient's   response to the exercise program.    Strength trainin-3 days / week  12-15 repetitions  1-2 sets per modality    Modalities: Pull Downs, Lateral Raise, Arm Extension, Arm Curl, and leg extensions    Home Exercise: Type: walking, inconsistently, has gradually been increasing distance    Exercise Education: benefit of exercise for CAD risk factors, home exercise guidelines, AHA guidelines to achieve >150 mins/wk of moderate exercise, and RPE scale     Readiness to change: Maintenance: (Maintaining the behavior change)      NUTRITION GOALS AND PLAN      Nutritional   Reviewed patient's Rate your Plate. Discussed key elements of heart healthy eating. Reviewed patient goals for dietary modifications and their clinical implications.  Reviewed most recent lipid profile.     Patient's progress toward Nutrition goals:    Goals met: Watching sodium and sugar intake, 8 lb weight gain since beginning rehab., Patient will be encouraged to focus on lifestyle modifications following discharge.      Nutrition Intervention:   After discharge patient will continue to maintain healthy lifestyle habits and focus on risk factor modification.    Measurable goals were based Rate Your Plate Dietary Self-Assessment. These are the areas in which the patient could score higher on the assessment.  Goals include  recommendations for a heart healthy diet based on American Heart Association.    Nutrition Education:   heart healthy eating principles  low sodium diet  maintaining hydration  nutrition for  lipid management  group class: Heart Healthy Eating  group class:  Label Reading    Readiness to change: Maintenance: (Maintaining the behavior change)      PSYCHOSOCIAL GOALS AND PLAN    Psychosocial Assessment as it relates to rehabilitation:   Patient denies issues with his/her family or home life that may affect their rehabilitation efforts.     Patient's progress toward Psychosocial goals:    Goals met: back to previous hobbies, continuing to spend time with family., Patient will be encouraged to focus on lifestyle modifications following discharge.    Psychosocial Intervention:   After discharge patient will continue to maintain healthy lifestyle habits and focus on risk factor modification.    Psychosocial Education: benefits of a positive support system and stress management techniques    Information to utilize [employer] was provided as well as contact information for counseling through  [facility] and group psychotherapy groups available.    Readiness to change: Maintenance: (Maintaining the behavior change)      OTHER CORE COMPONENTS GOALS and PLAN      Blood Pressure will be monitored throughout the program and cardiologist will be notified of elevated trends.    Pt will be encouraged to monitor home BP if advised by cardiologist.    Tobacco Intervention:   N/A:  Pt has a remote history of smoking.      Progress toward Core Component goals:   Goals met: BP WNL, medication compliance reported, reduced episodes of lightheadedness., Patient will be encouraged to focus on lifestyle modifications following discharge.    Other Core Components Intervention:   After discharge patient will continue to maintain healthy lifestyle habits and focus on risk factor modification.    Group and Individual Education:   understanding high blood pressure and it's relationship to CAD, components of blood pressure management, group class: Understanding Heart Disease, and group class:  Common Heart Medications    Readiness to change: Maintenance: (Maintaining the behavior change)